# Patient Record
Sex: FEMALE | Race: WHITE
[De-identification: names, ages, dates, MRNs, and addresses within clinical notes are randomized per-mention and may not be internally consistent; named-entity substitution may affect disease eponyms.]

---

## 2018-02-07 NOTE — EKG
[facility]
                                    [address]
                                  [place], [place]  [address]
_________________________________________________________________________________________
                                                                 Signed   
 
 
Sinus tachycardia
Septal infarct , age undetermined
Possible Inferior infarct , age undetermined
Abnormal ECG
No previous ECGs available
Confirmed by FARRAH GÓMEZ MD ([id number]) on 2/7/2018 2:30:09 PM
 
 
 
 
 
 
 
 
 
 
 
 
 
 
 
 
 
 
 
 
 
 
 
 
 
 
 
 
 
 
 
 
 
 
 
 
 
 
 
    Electronically Signed By: FARRAH GÓMEZ MD  02/07/18 1430
_________________________________________________________________________________________
PATIENT NAME:     DANIEL THORPEJAZLYN                
MEDICAL RECORD #: [id number]                     Electrocardiogram             
          ACCT #: [id number]  
DATE OF BIRTH:   [birth year]                                       
PHYSICIAN:   FARRAH GÓMEZ MD           REPORT #: [id number]
REPORT IS CONFIDENTIAL AND NOT TO BE RELEASED WITHOUT AUTHORIZATION

## 2021-07-07 NOTE — NUR
Patient is having a right total knee replacement on 7/14.  She has a walker
and will bring it with her.  She lives in a RV. There are 4 steps in to the RV
with rails and 2 steps to get to the bedroom. There is a walk-in shower
with a shower chair and a hand-held shower head.  There is no riser on the
toilet but the seat is higher. She lives with her .  For the first
couple of days she will be staying with a friend who has a single level house.
There is one step to go inside.  She does have transportation to her PT appts.
She will call and make her first appt.

## 2021-07-14 ENCOUNTER — HOSPITAL ENCOUNTER (OUTPATIENT)
Dept: HOSPITAL 46 - DS | Age: 61
Discharge: HOME | End: 2021-07-14
Attending: SPECIALIST
Payer: COMMERCIAL

## 2021-07-14 VITALS — WEIGHT: 202.38 LBS | BODY MASS INDEX: 33.72 KG/M2 | HEIGHT: 65 IN

## 2021-07-14 DIAGNOSIS — Z88.5: ICD-10-CM

## 2021-07-14 DIAGNOSIS — G89.18: ICD-10-CM

## 2021-07-14 DIAGNOSIS — M17.11: Primary | ICD-10-CM

## 2021-07-14 DIAGNOSIS — Z91.02: ICD-10-CM

## 2021-07-14 DIAGNOSIS — Z91.018: ICD-10-CM

## 2021-07-14 DIAGNOSIS — Z88.4: ICD-10-CM

## 2021-07-14 DIAGNOSIS — K21.9: ICD-10-CM

## 2021-07-14 PROCEDURE — 8E0YXBZ COMPUTER ASSISTED PROCEDURE OF LOWER EXTREMITY: ICD-10-PCS | Performed by: SPECIALIST

## 2021-07-14 PROCEDURE — 0SRC0JA REPLACEMENT OF RIGHT KNEE JOINT WITH SYNTHETIC SUBSTITUTE, UNCEMENTED, OPEN APPROACH: ICD-10-PCS | Performed by: SPECIALIST

## 2021-07-14 PROCEDURE — C1776 JOINT DEVICE (IMPLANTABLE): HCPCS

## 2021-07-14 PROCEDURE — C1713 ANCHOR/SCREW BN/BN,TIS/BN: HCPCS

## 2021-07-14 NOTE — NUR
LE 1220: PT IS GIVEN VERBAL DC INSTRUCTIONS WITH  PRESENT. THEY BOTH
VERBALIZE UNDERSTANDING. QUESTIONS ARE ASKED AND ANSWERED. THE PT IS TAKEN TO
VEHICLE VIA WC, SHE IS ABLE TO TRANSFER HERSELF FROM WC TO VEHICLE.

## 2021-07-14 NOTE — NUR
LE 1145: PT IS UP TO BATHROOM WITH ASSISTANCE FROM PHYSICAL THERAPY. SHE IS
ABLE TO VOID QUANTITY SUFFICIENT.

## 2021-07-14 NOTE — NUR
PT IS BACK TO  FROM PACU.  IS AT THE BEDSIDE. SHE IS REPORTING
FEELLING HER HAMSTRING BEING TIGHT ON THE RIGHT SIDE. WATER ON BEDSIDE TABLE.
CALL LIGHT WITHIN REACH. SHE WOULD LIKE APPLE SAUCE.

## 2021-07-14 NOTE — NUR
07/14/21 0912 Nikkie Napoles 0830 PT ARRIVED IN PACU SLEEPY WITH NO C/O'S. ON QUE SET AT 4ML/HR.
0845 CRYO CUFF PLACED ON R KNEE. 0847 TORADOL 30MG GIVEN IVP PER 
ORDERS. 0910 TO DS. REPORT GIVEN TO RN. SPOUSE AT BEDSIDE.

## 2021-07-14 NOTE — NUR
PT REPORTS AFTER HER FIRST DOSE OF OXYCODONE HER PAIN CAME DOWN FROM A 9 TO A
7, SECOND DOSE GIVEN. SHE IS TOLERATING APPLE SAUCE, WATER, AND COFFEE.
 IS AT THE BEDISE. CALL LIGHT WITHIN REACH. NO ADDITIONAL NEEDS AT THIS
TIME.

## 2021-07-16 NOTE — OR
[facility]
                                    [address] [place], [place]  [address]
_________________________________________________________________________________________
                                                                 Signed   
 
 
DATE OF OPERATION:
07/14/2021
 
SURGEON:
Ruben Ackerman MD
 
PREOPERATIVE DIAGNOSIS:
Severe degenerative joint disease of right knee.
 
POSTOPERATIVE DIAGNOSIS:
Severe degenerative joint disease of right knee.
 
PROCEDURE PERFORMED:
Right total knee arthroplasty with Dannie.
 
ASSISTANT:
Nikia Alejandro PA-C.  Nikia was present and critical for all portions of
procedure. 
 
ANESTHESIA:
Spinal.
 
BLOOD LOSS:
175 mL.
 
IMPLANTS:
Derick Triathlon size 2, 9 mm polyethylene and a 32 mm patella.
 
BRIEF HISTORY:
Daniel is a 61-year-old female with progressive worsening of her osteoarthritis, both
medial compartment and patellofemoral compartment.  Risks and benefits of operative
treatment were discussed with her and she elected to proceed. 
 
DESCRIPTION OF PROCEDURE:
Once consent was obtained, she was taken to the operating room.  After adequate
anesthesia, she was placed on operating room table, all downside pressure points were
well padded and a hip bump was placed.  The leg was then prepped and draped in a
standard sterile fashion.  No tourniquet was placed.  The knee was approached through a
standard anterior midline incision.  This was carried through skin and subcutaneous
tissue, the mid vastus approach was undertaken.  The MCL was elevated with a sleeve
around the posteromedial corner.  The infrapatellar fat pad was excised.  The menisci
were both absent.  The ACL was transected.  The checkpoints were placed in the medial
 
    Electronically Signed By: RUBEN ACKERMAN MD  07/16/21 0728
_________________________________________________________________________________________
PATIENT NAME:     DANIEL THORPE                 
MEDICAL RECORD #: [id number]            OPERATIVE REPORT              
          ACCT #: [id number]  
DATE OF BIRTH:   [birth year]            REPORT #: [id number]      
PHYSICIAN:        RUBEN ACKERMAN MD              
PCP:              MIKI ORTEGA MD           
REPORT IS CONFIDENTIAL AND NOT TO BE RELEASED WITHOUT AUTHORIZATION
 
 
                                  [facility]
                                    [address], [place]  [address]
_________________________________________________________________________________________
                                                                 Signed   
 
 
femoral condyle and proximal tibia.  The computer rays were placed in the medial femoral
condyle and tibia through percutaneous incisions in the tibia.  The leg was then
registered with the computer followed by the fine anatomic points of the knee.  The
varus valgus laxity was then assessed and the plan was changed just slightly with one
degree of external rotation of the femur.  The robot was then brought in and the four
straight cuts were made with care taken to protect the patellar tendon and MCL.  The 2
angle cuts were then made and all extra bone was removed as were any osteophytes.  The
posterior osteophytes removed off the femur, although they were minor.  The trials were
then positioned, the knee was taken from 0 to about 135 degrees of flexion.  She had
excellent stability throughout.  The patella was cut sized and drilled for a 32 mm
patella.  The femoral drill holes were made and the tibia was finished using the keel
punch.  Her bone was quite good, so we elected to go with a non-cemented prosthesis.
The prosthesis was selected and the tibia was impacted into position first followed by
the polyethylene.  The femur was then impacted into position and the knee was extended
and nicely loaded.  The patella was clamped into position.  The knee was taken through
range of motion and the patella tracked a little bit laterally, so I elected to go ahead
with lateral release, which centered the patella quite nicely.  The knee was pulse
lavaged with 1.5 L of normal saline followed by an Aricept soak and another 1.5 L of
normal saline under pulse lavage.  The On-Q pain pump was placed percutaneously into the
adductor canal from the suprapatellar pouch.  The periarticular soft tissues were
injected with 100 mL ropivacaine and Toradol mixture.  The arthrotomy was then closed
using #2 Stratafix, the subcutaneous tissue with #0 Stratafix, and skin with 3-0
Stratafix.  Steri-Strips were applied.  The wound was dressed with Acticoat 7 dressing,
ABDs and Ace wrap.  She has tolerated the procedure well.  All sponge, needle, and
instrument counts were correct. 
 
 
 
            ________________________________________
            MD MURRAY Peña/MODL
Job #:  [id number]
DD:  07/14/2021 08:23:53
DT:  07/14/2021 08:50:53
 
 
Copies:                                
~
 
 
 
    Electronically Signed By: RUBEN ACKERMAN MD  07/16/21 0728
_________________________________________________________________________________________
PATIENT NAME:     DANIEL THORPE                 
MEDICAL RECORD #: [id number]            OPERATIVE REPORT              
          ACCT #: [id number]  
DATE OF BIRTH:   [birth year]            REPORT #: [id number]      
PHYSICIAN:        RUBEN ACKERMAN MD              
PCP:              MIKI ORTEGA MD           
REPORT IS CONFIDENTIAL AND NOT TO BE RELEASED WITHOUT AUTHORIZATION

## 2022-08-06 ENCOUNTER — HOSPITAL ENCOUNTER (EMERGENCY)
Dept: HOSPITAL 46 - ED | Age: 62
Discharge: HOME | End: 2022-08-06
Payer: COMMERCIAL

## 2022-08-06 VITALS — BODY MASS INDEX: 35.36 KG/M2 | WEIGHT: 212.24 LBS | HEIGHT: 65 IN

## 2022-08-06 DIAGNOSIS — Z88.8: ICD-10-CM

## 2022-08-06 DIAGNOSIS — J40: Primary | ICD-10-CM

## 2022-08-06 DIAGNOSIS — Z79.899: ICD-10-CM

## 2022-08-06 DIAGNOSIS — Z20.822: ICD-10-CM

## 2022-08-06 DIAGNOSIS — Z79.82: ICD-10-CM

## 2022-08-06 DIAGNOSIS — J02.9: ICD-10-CM

## 2022-08-06 DIAGNOSIS — Z88.5: ICD-10-CM

## 2022-08-06 PROCEDURE — C9803 HOPD COVID-19 SPEC COLLECT: HCPCS

## 2022-08-06 PROCEDURE — U0003 INFECTIOUS AGENT DETECTION BY NUCLEIC ACID (DNA OR RNA); SEVERE ACUTE RESPIRATORY SYNDROME CORONAVIRUS 2 (SARS-COV-2) (CORONAVIRUS DISEASE [COVID-19]), AMPLIFIED PROBE TECHNIQUE, MAKING USE OF HIGH THROUGHPUT TECHNOLOGIES AS DESCRIBED BY CMS-2020-01-R: HCPCS
